# Patient Record
Sex: FEMALE | Race: WHITE | NOT HISPANIC OR LATINO | Employment: UNEMPLOYED | URBAN - METROPOLITAN AREA
[De-identification: names, ages, dates, MRNs, and addresses within clinical notes are randomized per-mention and may not be internally consistent; named-entity substitution may affect disease eponyms.]

---

## 2021-05-30 ENCOUNTER — RECORDS - HEALTHEAST (OUTPATIENT)
Dept: ADMINISTRATIVE | Facility: CLINIC | Age: 56
End: 2021-05-30

## 2021-05-31 ENCOUNTER — RECORDS - HEALTHEAST (OUTPATIENT)
Dept: ADMINISTRATIVE | Facility: CLINIC | Age: 56
End: 2021-05-31

## 2022-08-31 ENCOUNTER — HOSPITAL ENCOUNTER (EMERGENCY)
Facility: HOSPITAL | Age: 57
Discharge: HOME | End: 2022-08-31
Attending: STUDENT IN AN ORGANIZED HEALTH CARE EDUCATION/TRAINING PROGRAM | Admitting: STUDENT IN AN ORGANIZED HEALTH CARE EDUCATION/TRAINING PROGRAM
Payer: COMMERCIAL

## 2022-08-31 VITALS
HEART RATE: 69 BPM | DIASTOLIC BLOOD PRESSURE: 97 MMHG | BODY MASS INDEX: 22.44 KG/M2 | SYSTOLIC BLOOD PRESSURE: 182 MMHG | WEIGHT: 143 LBS | RESPIRATION RATE: 16 BRPM | HEIGHT: 67 IN | OXYGEN SATURATION: 98 % | TEMPERATURE: 98.3 F

## 2022-08-31 DIAGNOSIS — M54.42 ACUTE LEFT-SIDED LOW BACK PAIN WITH LEFT-SIDED SCIATICA: ICD-10-CM

## 2022-08-31 PROCEDURE — 250N000012 HC RX MED GY IP 250 OP 636 PS 637: Performed by: STUDENT IN AN ORGANIZED HEALTH CARE EDUCATION/TRAINING PROGRAM

## 2022-08-31 PROCEDURE — 250N000013 HC RX MED GY IP 250 OP 250 PS 637: Performed by: EMERGENCY MEDICINE

## 2022-08-31 PROCEDURE — 250N000013 HC RX MED GY IP 250 OP 250 PS 637: Performed by: STUDENT IN AN ORGANIZED HEALTH CARE EDUCATION/TRAINING PROGRAM

## 2022-08-31 PROCEDURE — 99283 EMERGENCY DEPT VISIT LOW MDM: CPT

## 2022-08-31 RX ORDER — NAPROXEN SODIUM 220 MG
440 TABLET ORAL ONCE
Status: DISCONTINUED | OUTPATIENT
Start: 2022-08-31 | End: 2022-08-31

## 2022-08-31 RX ORDER — NAPROXEN 250 MG/1
500 TABLET ORAL ONCE
Status: COMPLETED | OUTPATIENT
Start: 2022-08-31 | End: 2022-08-31

## 2022-08-31 RX ORDER — PREDNISONE 20 MG/1
60 TABLET ORAL DAILY
Qty: 12 TABLET | Refills: 0 | Status: SHIPPED | OUTPATIENT
Start: 2022-08-31 | End: 2022-09-04

## 2022-08-31 RX ORDER — HYDROCODONE BITARTRATE AND ACETAMINOPHEN 5; 325 MG/1; MG/1
1 TABLET ORAL ONCE
Status: COMPLETED | OUTPATIENT
Start: 2022-08-31 | End: 2022-08-31

## 2022-08-31 RX ORDER — PREDNISONE 20 MG/1
60 TABLET ORAL ONCE
Status: COMPLETED | OUTPATIENT
Start: 2022-08-31 | End: 2022-08-31

## 2022-08-31 RX ORDER — HYDROCODONE BITARTRATE AND ACETAMINOPHEN 5; 325 MG/1; MG/1
1 TABLET ORAL EVERY 6 HOURS PRN
Qty: 6 TABLET | Refills: 0 | Status: SHIPPED | OUTPATIENT
Start: 2022-08-31 | End: 2022-09-03

## 2022-08-31 RX ADMIN — NAPROXEN 500 MG: 250 TABLET ORAL at 12:20

## 2022-08-31 RX ADMIN — PREDNISONE 60 MG: 20 TABLET ORAL at 13:23

## 2022-08-31 RX ADMIN — HYDROCODONE BITARTRATE AND ACETAMINOPHEN 1 TABLET: 5; 325 TABLET ORAL at 13:23

## 2022-08-31 NOTE — ED NOTES
"ED Provider In Triage Note  [facility]  Encounter Date: Aug 31, 2022    Chief Complaint   Patient presents with     Back Pain       Brief HPI:   Jolie Lr is a 57 year old female presenting to the Emergency Department with a chief complaint of back pain. Pain started Sunday, denies and new activities or falls. Pain is in left lower back and radiates down left leg. No fever, chest pain, no change in bowel or bladder habits.     Brief Physical Exam:  BP (!) 157/104   Pulse 70   Temp 98.3  F (36.8  C) (Tympanic)   Resp 16   Ht 1.702 m (5' 7\")   Wt 64.9 kg (143 lb)   SpO2 96%   BMI 22.40 kg/m    General: Non-toxic appearing  HEENT: Atraumatic  Resp: No respiratory distress  Abdomen: Non-peritoneal  Neuro: Alert, oriented, answers questions appropriately  Psych: Behavior appropriate      Plan Initiated in Triage:  Orders Placed This Encounter     CT Lumbar Spine w/o Contrast     naproxen sodium (ANAPROX) tablet 440 mg           PIT Dispo:   Return to lobby while awaiting workup and ED bed availability. Concern for sciatica, less concern for lumbar fracture or dislocation.     GEORGE MARSHALL MD on 8/31/2022 at 12:04 PM    Patient was evaluated by the Physician in Triage due to a limitation of available rooms in the Emergency Department. A plan of care was discussed based on the information obtained on the initial evaluation and patient was consuled to return back to the Emergency Department lobby after this initial evalutaiton until results were obtained or a room became available in the Emergency Department. Patient was counseled not to leave prior to receiving the results of their workup.     GEORGE MARSHALL MD  [facility] EMERGENCY DEPARTMENT  [address] [place] [id number]  [phone]       George Marshall MD  08/31/22 1210    "

## 2022-08-31 NOTE — ED TRIAGE NOTES
The pt c/o left sided back pain that shoots down the left leg. Denies any trauma to the site. The pt state hx of  This pain and had muscle relaxants that were helpful.

## 2022-08-31 NOTE — DISCHARGE INSTRUCTIONS
As we discussed, I believe you have a pinched nerve in your back irritating the sciatic nerve which is causing the pain rating down your left leg.    Please take 500 mg of Tylenol every 4 hours as well as 600 mg of ibuprofen every 6 hours for pain.    Please take hydrocodone/acetaminophen prescription as prescribed.  Member, do not take more than 4 g of Tylenol in 24 hours.    Take prednisone as prescribed.    Use heating pads.    Follow-up with your primary care doctor in 10 days or so if your pain is persisting otherwise you can follow-up with the orthopedic clinic listed for further imaging.    If you have any bowel or bladder incontinence, weakness, numbness in between your legs, or any other concerning symptoms please return to the ER for repeat evaluation.

## 2022-08-31 NOTE — ED PROVIDER NOTES
Emergency Department Encounter         FINAL IMPRESSION:  Back pain        ED COURSE AND MEDICAL DECISION MAKING       ED Course as of 08/31/22 1252   Wed Aug 31, 2022   1249 Patient is a 57-year-old female with a history of hypertension, here with increasing left lower back with radiation down left leg for the past 3 days.  Denies any fevers chills nausea or vomiting.  No trauma.  States that the pain slowly began.  No radiation into her abdomen or chest.  No hematuria.  No bowel movement changes.  No weakness in the legs.  No history of back issues recently however patient states has had pain like this in the past not as severe.  On arrival she looks well.  Mildly hypertensive here received naproxen already when I saw her.  Examination revealing normal heart and lungs.  She has no midline pain.  She has pain to palpation of the left lower lumbosacral region with no rash or skin changes.  I suspect nerve impingement with sciatic involvement.  No signs of epidural abscess, discitis, cauda equina.  She denies any bowel or bladder incontinence saddle anesthesia or retention.  Plan for prednisone, hydrocodone Tylenol/ibuprofen at home for pain as well as heating packs and close outpatient follow-up.       12:33 PM I met with the patient, obtained history, performed an initial exam, and discussed options and plan for diagnostics and treatment here in the ED.  12:45 PM We discussed the plan for discharge and the patient is agreeable. Reviewed supportive cares, symptomatic treatment, outpatient follow up, and reasons to return to the Emergency Department. Patient to be discharged by ED RN.       At the conclusion of the encounter I discussed the results of all the tests and the disposition. The questions were answered. The patient or family acknowledged understanding and was agreeable with the care plan.                  MEDICATIONS GIVEN IN THE EMERGENCY DEPARTMENT:  Medications   naproxen (NAPROSYN) tablet 500 mg (500  mg Oral Given 8/31/22 1220)       NEW PRESCRIPTIONS STARTED AT TODAY'S ED VISIT:  New Prescriptions    No medications on file       HPI     Patient information obtained from: Patient     Use of Interpretor: N/A     Jolie Lr is a 57 year old female with a pertinent history of hypertension who presents to this ED by wheelchair for evaluation of left lower back pain.     Three days prior (08/28/2022), patient felt sudden left lower back pain that radiates down to her leg. At present, pain is still present and has not gone away. Patient notes that she has had back pain years ago, but states that it was not as severe. Patient states that this pain came out of no where. She has used a heating pad, which alleviates some pain. Patient is unable to walk due to the pain. Patient denies any recent injuries or trauma to area. Patient denies fever, abdominal pain, chest pain, leg tingling and numbness, hematuria, issues with urine or bowel movements, or any other complaints at this time.     Of note, patient denies history of back surgeries.       REVIEW OF SYSTEMS:  Review of Systems   Constitutional: Negative for fever, malaise  HEENT: Negative runny nose, sore throat, ear pain, neck pain  Respiratory: Negative for shortness of breath, cough, congestion  Cardiovascular: Negative for chest pain, leg edema  Gastrointestinal: Negative for abdominal distention, abdominal pain, constipation, vomiting, nausea, diarrhea  Genitourinary: Negative for dysuria and hematuria.   Integument: Negative for rash, skin breakdown  Neurological: Negative for paresthesias, weakness, headache, tingling or numbness of leg.   Musculoskeletal: Positive for lower back pain (left). Negative for joint pain, joint swelling      All other systems reviewed and are negative.          MEDICAL HISTORY     No past medical history on file.    No past surgical history on file.         No current outpatient medications on file.          PHYSICAL EXAM     BP (!)  "157/104   Pulse 70   Temp 98.3  F (36.8  C) (Tympanic)   Resp 16   Ht 1.702 m (5' 7\")   Wt 64.9 kg (143 lb)   SpO2 96%   BMI 22.40 kg/m        PHYSICAL EXAM:     General: Patient appears well, nontoxic, comfortable  HEENT: Moist mucous membranes,  No head trauma.  No midline neck pain.  Cardiovascular: Normal rate, normal rhythm, no extremity edema.  No appreciable murmur.  Respiratory: No signs of respiratory distress, lungs are clear to auscultation bilaterally with no wheezes rhonchi or rales.  Abdominal: Soft, nontender, nondistended, no palpable masses, no guarding, no rebound  Musculoskeletal: Pain to palpation of the left lower lumbosacral region with no midline pain, no step-offs, no rash or skin changes  Neurological: Alert and oriented, grossly neurologically intact.  Psychological: Normal affect and mood.  Integument: No rashes appreciated          RESULTS                       PROCEDURES:  Procedures:  Procedures       IElif am serving as a scribe to document services personally performed by Arnaldo Case DO, based on my observations and the provider's statements to me.  I, Arnaldo Case DO, attest that Elif Ordonez is acting in a scribe capacity, has observed my performance of the services and has documented them in accordance with my direction.    Arnaldo Case DO  Emergency Medicine  [facility] EMERGENCY DEPARTMENT     Arnaldo Case DO  08/31/22 1257    "

## 2022-08-31 NOTE — ED TRIAGE NOTES
Triage Assessment     Row Name 08/31/22 1200       Triage Assessment (Adult)    Airway WDL WDL       Respiratory WDL    Respiratory WDL WDL       Skin Circulation/Temperature WDL    Skin Circulation/Temperature WDL WDL       Cardiac WDL    Cardiac WDL WDL       Peripheral/Neurovascular WDL    Peripheral Neurovascular WDL X  shooting pain down left leg       Cognitive/Neuro/Behavioral WDL    Cognitive/Neuro/Behavioral WDL WDL

## 2023-04-06 ENCOUNTER — APPOINTMENT (OUTPATIENT)
Dept: CT IMAGING | Facility: HOSPITAL | Age: 58
End: 2023-04-06
Attending: FAMILY MEDICINE
Payer: COMMERCIAL

## 2023-04-06 ENCOUNTER — HOSPITAL ENCOUNTER (EMERGENCY)
Facility: HOSPITAL | Age: 58
Discharge: HOME | End: 2023-04-06
Attending: FAMILY MEDICINE | Admitting: FAMILY MEDICINE
Payer: COMMERCIAL

## 2023-04-06 VITALS
HEIGHT: 67 IN | OXYGEN SATURATION: 98 % | DIASTOLIC BLOOD PRESSURE: 96 MMHG | WEIGHT: 140 LBS | TEMPERATURE: 98.1 F | RESPIRATION RATE: 20 BRPM | SYSTOLIC BLOOD PRESSURE: 169 MMHG | BODY MASS INDEX: 21.97 KG/M2 | HEART RATE: 69 BPM

## 2023-04-06 DIAGNOSIS — S16.1XXA STRAIN OF NECK MUSCLE, INITIAL ENCOUNTER: ICD-10-CM
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
## 2023-04-06 PROCEDURE — 250N000013 HC RX MED GY IP 250 OP 250 PS 637: Performed by: FAMILY MEDICINE

## 2023-04-06 PROCEDURE — 99284 EMERGENCY DEPT VISIT MOD MDM: CPT | Mod: 25

## 2023-04-06 PROCEDURE — 72125 CT NECK SPINE W/O DYE: CPT

## 2023-04-06 RX ORDER — HYDROCODONE BITARTRATE AND ACETAMINOPHEN 5; 325 MG/1; MG/1
1 TABLET ORAL EVERY 6 HOURS PRN
Qty: 10 TABLET | Refills: 0 | Status: SHIPPED | OUTPATIENT
Start: 2023-04-06 | End: 2023-04-09

## 2023-04-06 RX ORDER — CYCLOBENZAPRINE HCL 10 MG
10 TABLET ORAL 3 TIMES DAILY PRN
Qty: 20 TABLET | Refills: 0 | Status: SHIPPED | OUTPATIENT
Start: 2023-04-06 | End: 2023-04-12

## 2023-04-06 RX ORDER — IBUPROFEN 600 MG/1
600 TABLET, FILM COATED ORAL ONCE
Status: COMPLETED | OUTPATIENT
Start: 2023-04-06 | End: 2023-04-06

## 2023-04-06 RX ADMIN — IBUPROFEN 600 MG: 600 TABLET ORAL at 13:48

## 2023-04-06 ASSESSMENT — ENCOUNTER SYMPTOMS: NECK PAIN: 1

## 2023-04-06 NOTE — Clinical Note
Jolie Lr was seen and treated in our emergency department on 4/6/2023.    Please excuse son from work 4/6/2023     Sincerely,     [facility] Emergency Department

## 2023-04-06 NOTE — Clinical Note
Jolie Lr was seen and treated in our emergency department on 4/6/2023.    Please excuse son from work 4/6/2023     Sincerely,     [facility] Emergency Department
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
